# Patient Record
Sex: FEMALE | Race: WHITE | NOT HISPANIC OR LATINO | URBAN - METROPOLITAN AREA
[De-identification: names, ages, dates, MRNs, and addresses within clinical notes are randomized per-mention and may not be internally consistent; named-entity substitution may affect disease eponyms.]

---

## 2017-01-30 ENCOUNTER — HOSPITAL ENCOUNTER (OUTPATIENT)
Dept: ULTRASOUND IMAGING | Facility: CLINIC | Age: 37
Discharge: HOME | End: 2017-01-30
Attending: OBSTETRICS & GYNECOLOGY

## 2017-01-30 ENCOUNTER — HOSPITAL ENCOUNTER (OUTPATIENT)
Dept: MAMMOGRAPHY | Facility: CLINIC | Age: 37
Discharge: HOME | End: 2017-01-30
Attending: OBSTETRICS & GYNECOLOGY

## 2017-01-30 DIAGNOSIS — Z09 FOLLOW-UP EXAM: ICD-10-CM

## 2017-08-05 ENCOUNTER — HEALTH MAINTENANCE LETTER (OUTPATIENT)
Age: 37
End: 2017-08-05

## 2017-08-21 ENCOUNTER — COMMUNICATION - HEALTHEAST (OUTPATIENT)
Dept: HEALTH INFORMATION MANAGEMENT | Facility: CLINIC | Age: 37
End: 2017-08-21

## 2017-08-23 ENCOUNTER — COMMUNICATION - HEALTHEAST (OUTPATIENT)
Dept: HEALTH INFORMATION MANAGEMENT | Facility: CLINIC | Age: 37
End: 2017-08-23

## 2018-01-31 ENCOUNTER — OFFICE VISIT - HEALTHEAST (OUTPATIENT)
Dept: FAMILY MEDICINE | Facility: CLINIC | Age: 38
End: 2018-01-31

## 2018-01-31 DIAGNOSIS — R05.9 COUGH: ICD-10-CM

## 2018-01-31 DIAGNOSIS — J06.9 URI (UPPER RESPIRATORY INFECTION): ICD-10-CM

## 2018-01-31 LAB
DEPRECATED S PYO AG THROAT QL EIA: NORMAL
FLUAV AG SPEC QL IA: NORMAL
FLUBV AG SPEC QL IA: NORMAL

## 2018-01-31 ASSESSMENT — MIFFLIN-ST. JEOR: SCORE: 1249.88

## 2018-02-02 LAB — GROUP A STREP BY PCR: NORMAL

## 2018-05-29 ENCOUNTER — RECORDS - HEALTHEAST (OUTPATIENT)
Dept: ADMINISTRATIVE | Facility: OTHER | Age: 38
End: 2018-05-29

## 2018-06-12 ENCOUNTER — HOSPITAL ENCOUNTER (OUTPATIENT)
Dept: MAMMOGRAPHY | Facility: CLINIC | Age: 38
Discharge: HOME | End: 2018-06-12
Attending: OBSTETRICS & GYNECOLOGY

## 2018-06-12 DIAGNOSIS — N63.0 BREAST NODULE: ICD-10-CM

## 2018-08-12 ENCOUNTER — HEALTH MAINTENANCE LETTER (OUTPATIENT)
Age: 38
End: 2018-08-12

## 2019-11-05 ENCOUNTER — HEALTH MAINTENANCE LETTER (OUTPATIENT)
Age: 39
End: 2019-11-05

## 2019-12-20 ENCOUNTER — RECORDS - HEALTHEAST (OUTPATIENT)
Dept: MAMMOGRAPHY | Facility: CLINIC | Age: 39
End: 2019-12-20

## 2019-12-20 DIAGNOSIS — Z12.31 ENCOUNTER FOR SCREENING MAMMOGRAM FOR MALIGNANT NEOPLASM OF BREAST: ICD-10-CM

## 2019-12-24 ENCOUNTER — TRANSFERRED RECORDS (OUTPATIENT)
Dept: MULTI SPECIALTY CLINIC | Facility: CLINIC | Age: 39
End: 2019-12-24

## 2020-09-15 ENCOUNTER — RECORDS - HEALTHEAST (OUTPATIENT)
Dept: ADMINISTRATIVE | Facility: OTHER | Age: 40
End: 2020-09-15

## 2020-09-18 NOTE — PROGRESS NOTES
Calli is a 40 year old No obstetric history on file. female who presents for annual exam.     Besides routine health maintenance, she has no other health concerns today .    HPI:  The patient's PCP is Yolid, Leo    NP    Was seeing a private GYN clinic.   Is on BHRT, testosterone and prometrium.   Was getting annual paps. Thinks maybe there were abnormal cells to watch for.  No colposcopies.   Last mammo . Wnl.  Hx of breast cysts, had several imaging exams in past.     Exercising  3x/wk. Vit D.     GYNECOLOGIC HISTORY:    Patient's last menstrual period was 2020.    Regular menses? no  Menses every 26/ days.  Length of menses: 4 days    Her current contraception method is: none.  She  reports that she has never smoked. She has never used smokeless tobacco.    Patient is sexually active.  STD testing offered?  Declined  Last PHQ-9 score on record =   PHQ-9 SCORE 2020   PHQ-9 Total Score 0     Last GAD7 score on record =   JENN-7 SCORE 2020   Total Score 0     Alcohol Score = 1    HEALTH MAINTENANCE:  Cholesterol:   Cholesterol   Date Value Ref Range Status   2003 167 <200 mg/dL Final     Comment:     Cholesterol Reference Range:   <200  The NCEP recommends further         evaluation of:         1.  Patients with cholesterol             greater than 200 mg/dL             if additional risk factors             are present.         2.  All patients with a             cholesterol greater than             240 mg/dL.      Last Mammo: 2019, Result: Normal, Next Mammo: pt will schedule one when it is exactly a year  Pap:   Lab Results   Component Value Date    PAP NIL 2007    PAP NIL 2006      Colonoscopy:  NA, Result: Not applicable, Next Colonoscopy: 9 years.  Dexa:  NA    Health maintenance updated:  no    HISTORY:  OB History    Para Term  AB Living   3 3 3 0 0 3   SAB TAB Ectopic Multiple Live Births   0 0 0 0 3      # Outcome Date GA Lbr Rudy/2nd Weight  "Sex Delivery Anes PTL Lv   3 Term            2 Term            1 Term                Patient Active Problem List   Diagnosis     Basal Cell Carcinoma     CARDIOVASCULAR SCREENING; LDL GOAL LESS THAN 160     Past Surgical History:   Procedure Laterality Date     C BREAST AUGMENTATION  03/2019     C NONSPECIFIC PROCEDURE  AGE 7    R ELBOW ORIF     HC ENLARGE BREAST WITH IMPLANT  2003      Social History     Tobacco Use     Smoking status: Never Smoker     Smokeless tobacco: Never Used   Substance Use Topics     Alcohol use: No     Comment: None      Problem (# of Occurrences) Relation (Name,Age of Onset)    Cancer (1) Sister: Melanoma    Diabetes (1) Maternal Grandfather    Hypertension (1) Mother    No Known Problems (7) Father, Maternal Grandmother, Paternal Grandmother, Brother, Brother, Sister, Other       Negative family history of: C.A.D., Breast Cancer, Cancer - colorectal, Alcohol/Drug, Allergies            Current Outpatient Medications   Medication Sig     PROGESTERONE 100MG CAPSULES COMPOUND Take by mouth At Bedtime Take nightly by mouth     Testosterone 25 MG PLLT Unknown dosage testosterone pellets     vitamin D2 (ERGOCALCIFEROL) 49105 units (1250 mcg) capsule Take 50,000 Units by mouth daily     No current facility-administered medications for this visit.      Allergies   Allergen Reactions     Adhesive Tape      Bandaids     Erythromycin        Past medical, surgical, social and family histories were reviewed and updated in EPIC.    ROS:   12 point review of systems negative other than symptoms noted below or in the HPI.  Genitourinary: Hot Flashes, Irregular Menses and Night Sweats      EXAM:  /76   Pulse 62   Ht 1.702 m (5' 7\")   Wt 55.3 kg (122 lb)   LMP 09/07/2020   BMI 19.11 kg/m     BMI: Body mass index is 19.11 kg/m .    PHYSICAL EXAM:  Constitutional:   Appearance: Well nourished, well developed, alert, in no acute distress  Neck:  Lymph Nodes:  No lymphadenopathy present    Thyroid:  " Gland size normal, nontender, no nodules or masses present  on palpation  Chest:  Respiratory Effort:  Breathing unlabored  Cardiovascular:    Heart: Auscultation:  Regular rate, normal rhythm, no murmurs present  Breasts: Inspection of Breasts:  No lymphadenopathy present., Palpation of Breasts and Axillae:  No masses present on palpation, no breast tenderness., Axillary Lymph Nodes:  No lymphadenopathy present. and No nodularity, asymmetry or nipple discharge bilaterally.  Gastrointestinal:   Abdominal Examination:  Abdomen nontender to palpation, tone normal without rigidity or guarding, no masses present, umbilicus without lesions   Liver and Spleen:  No hepatomegaly present, liver nontender to palpation    Hernias:  No hernias present  Lymphatic: Lymph Nodes:  No other lymphadenopathy present  Skin:  General Inspection:  No rashes present, no lesions present, no areas of  discoloration  Neurologic:    Mental Status:  Oriented X3.  Normal strength and tone, sensory exam                grossly normal, mentation intact and speech normal.    Psychiatric:   Mentation appears normal and affect normal/bright.         Pelvic Exam:  External Genitalia:     Normal appearance for age, no discharge present, no tenderness present, no inflammatory lesions present, color normal  Vagina:     Normal vaginal vault without central or paravaginal defects, no discharge present, no inflammatory lesions present, no masses present  Bladder:     Nontender to palpation  Urethra:   Urethral Body:  Urethra palpation normal, urethra structural support normal   Urethral Meatus:  No erythema or lesions present  Cervix:     Appearance healthy, no lesions present, nontender to palpation, no bleeding present  Uterus:     Uterus: firm, normal sized and nontender, anteverted in position.   Adnexa:     No adnexal tenderness present, no adnexal masses present  Perineum:     Perineum within normal limits, no evidence of trauma, no rashes or skin  lesions present  Anus:     Anus within normal limits, no hemorrhoids present  Inguinal Lymph Nodes:     No lymphadenopathy present  Pubic Hair:     Normal pubic hair distribution for age  Genitalia and Groin:     No rashes present, no lesions present, no areas of discoloration, no masses present      COUNSELING:   Reviewed preventive health counseling, as reflected in patient instructions       Osteoporosis Prevention/Bone Health    BMI: Body mass index is 19.11 kg/m .      ASSESSMENT:  40 year old female with satisfactory annual exam.    ICD-10-CM    1. Encounter for gynecological examination without abnormal finding  Z01.419 Pap imaged thin layer screen with HPV - recommended age 30 - 65     HPV High Risk Types DNA Cervical   2. Cervical cancer screening  Z12.4        PLAN:  -Pap/hpv obtained for cervical cancer screening-unknown hx at this time due to no records available. Pt reports abnormal letters in past, most recently last year. If abnormal pap or HPV this time, will do colposcopy.  -Breast self awareness discussed. Due in Dec for mammogram.  -Discussed exercise-making plan, strength training. Nutrition encouraged.  -Colonoscopy age 44  -Osteoporosis prevention discussed.  -PCP manages labs  -On BHRT by another provider.   -Return one year for next annual exam          Glo Mcdaniel Masters, DO

## 2020-09-21 ENCOUNTER — OFFICE VISIT (OUTPATIENT)
Dept: OBGYN | Facility: CLINIC | Age: 40
End: 2020-09-21
Payer: COMMERCIAL

## 2020-09-21 VITALS
HEART RATE: 62 BPM | BODY MASS INDEX: 19.15 KG/M2 | SYSTOLIC BLOOD PRESSURE: 110 MMHG | WEIGHT: 122 LBS | HEIGHT: 67 IN | DIASTOLIC BLOOD PRESSURE: 76 MMHG

## 2020-09-21 DIAGNOSIS — Z01.419 ENCOUNTER FOR GYNECOLOGICAL EXAMINATION WITHOUT ABNORMAL FINDING: Primary | ICD-10-CM

## 2020-09-21 DIAGNOSIS — Z12.4 CERVICAL CANCER SCREENING: ICD-10-CM

## 2020-09-21 PROCEDURE — 87624 HPV HI-RISK TYP POOLED RSLT: CPT | Performed by: OBSTETRICS & GYNECOLOGY

## 2020-09-21 PROCEDURE — G0145 SCR C/V CYTO,THINLAYER,RESCR: HCPCS | Performed by: OBSTETRICS & GYNECOLOGY

## 2020-09-21 PROCEDURE — 99386 PREV VISIT NEW AGE 40-64: CPT | Performed by: OBSTETRICS & GYNECOLOGY

## 2020-09-21 RX ORDER — ERGOCALCIFEROL 1.25 MG/1
50000 CAPSULE, LIQUID FILLED ORAL DAILY
COMMUNITY

## 2020-09-21 ASSESSMENT — MIFFLIN-ST. JEOR: SCORE: 1251.02

## 2020-09-21 ASSESSMENT — ANXIETY QUESTIONNAIRES
3. WORRYING TOO MUCH ABOUT DIFFERENT THINGS: NOT AT ALL
GAD7 TOTAL SCORE: 0
1. FEELING NERVOUS, ANXIOUS, OR ON EDGE: NOT AT ALL
5. BEING SO RESTLESS THAT IT IS HARD TO SIT STILL: NOT AT ALL
6. BECOMING EASILY ANNOYED OR IRRITABLE: NOT AT ALL
IF YOU CHECKED OFF ANY PROBLEMS ON THIS QUESTIONNAIRE, HOW DIFFICULT HAVE THESE PROBLEMS MADE IT FOR YOU TO DO YOUR WORK, TAKE CARE OF THINGS AT HOME, OR GET ALONG WITH OTHER PEOPLE: NOT DIFFICULT AT ALL
7. FEELING AFRAID AS IF SOMETHING AWFUL MIGHT HAPPEN: NOT AT ALL
2. NOT BEING ABLE TO STOP OR CONTROL WORRYING: NOT AT ALL

## 2020-09-21 ASSESSMENT — PATIENT HEALTH QUESTIONNAIRE - PHQ9
5. POOR APPETITE OR OVEREATING: NOT AT ALL
SUM OF ALL RESPONSES TO PHQ QUESTIONS 1-9: 0

## 2020-09-21 NOTE — LETTER
October 7, 2020      Calli Jarvis  [address]  [facility] [address]        Dear ,    We are happy to inform you that your recent Pap smear and Human Papillomavirus (HPV) test results are normal and negative.    It is recommended that you have your next Pap smear and Human Papillomavirus (HPV) test in 3 years. You will also need to return to the clinic every year for an annual wellness visit.    If you have additional questions regarding this result, please contact our office and we will be happy to assist you.      Sincerely,    Your [facility] Care Team

## 2020-09-21 NOTE — Clinical Note
Other Tests found in the patient's chart through Chart Review/Care Everywhere:    Mammogram done by [facility] this date: 2019    Note to Abstraction: If this section is blank, no results were found via Chart Review/Care Everywhere.

## 2020-09-21 NOTE — NURSING NOTE
Other Tests found in the patient's chart through Chart Review/Care Everywhere:    Mammogram done by [facility] this date: 2019    Note to Abstraction: If this section is blank, no results were found via Chart Review/Care Everywhere.

## 2020-09-21 NOTE — PATIENT INSTRUCTIONS
-Daily total calcium intake (between food/supplements) should be 1000mg which equates to 3-4 servings calcium containing food per day; VItamin D 1000IU.   Foods rich in calcium are: milk, cheese, yogurt, seafood, sardines and canned salmon, leafy green vegetables such as estefani greens, spinach and kale, beans and lentils, almonds, seeds (poppy, sesame, celery, candelario), rhubarb, dried fruit such as figs, whey protein, tofu and edamame, amaranth, other foods with added calcium such as orange juice and some cereals.   If adequate amount not taken in diet, then a supplement may be needed.     -I also recommend increasing your dietary fiber by starting Metamucil (powder mixed in glass of water) twice daily

## 2020-09-22 ASSESSMENT — ANXIETY QUESTIONNAIRES: GAD7 TOTAL SCORE: 0

## 2020-09-25 LAB
COPATH REPORT: NORMAL
PAP: NORMAL

## 2020-09-28 LAB
FINAL DIAGNOSIS: NORMAL
HPV HR 12 DNA CVX QL NAA+PROBE: NEGATIVE
HPV16 DNA SPEC QL NAA+PROBE: NEGATIVE
HPV18 DNA SPEC QL NAA+PROBE: NEGATIVE
SPECIMEN DESCRIPTION: NORMAL
SPECIMEN SOURCE CVX/VAG CYTO: NORMAL

## 2020-11-22 ENCOUNTER — HEALTH MAINTENANCE LETTER (OUTPATIENT)
Age: 40
End: 2020-11-22

## 2021-03-16 ENCOUNTER — RECORDS - HEALTHEAST (OUTPATIENT)
Dept: MAMMOGRAPHY | Facility: CLINIC | Age: 41
End: 2021-03-16

## 2021-03-16 DIAGNOSIS — Z12.31 ENCOUNTER FOR SCREENING MAMMOGRAM FOR MALIGNANT NEOPLASM OF BREAST: ICD-10-CM

## 2021-06-01 VITALS — BODY MASS INDEX: 18.19 KG/M2 | WEIGHT: 120 LBS | HEIGHT: 68 IN

## 2021-06-15 NOTE — PROGRESS NOTES
"Assessment/Plan:         Visit Diagnoses     URI (upper respiratory infection)    -  Primary        Patient Instructions   Suspect probable viral etiology.    1) Over the counter cold medication as needed.  2) Ibuprofen every 6 hours or Tylenol every 4 hours as needed for fever/discomfort.  3) Follow up in 7-10 days if not improving, sooner if worsening or other concerns.            Subjective:   Calli Jarvis is a 37 y.o. female who presents today complaining of cough, congestion, chills.  No fever.  Symptoms have been ongoing for about 4 days.      Patient Active Problem List   Diagnosis     Warts        Past Medical History:   Diagnosis Date     Basal cell cancer 2009 - 2014        Past Surgical History:   Procedure Laterality Date     AUGMENTATION MAMMAPLASTY Bilateral 2003        No current outpatient prescriptions on file.      Review of Systems  See HPI     Objective:     Vitals:    01/31/18 1637   BP: 114/70   Patient Site: Left Arm   Patient Position: Sitting   Cuff Size: Adult Regular   Pulse: 67   Resp: 16   Temp: 98.5  F (36.9  C)   TempSrc: Oral   SpO2: 96%   Weight: 120 lb (54.4 kg)   Height: 5' 7.5\" (1.715 m)        Physical Exam   Constitutional: She appears well-developed and well-nourished. No distress.   HENT:   Right Ear: Tympanic membrane is not erythematous, not retracted and not bulging. A middle ear effusion is present.   Left Ear: Tympanic membrane is not erythematous, not retracted and not bulging. A middle ear effusion is present.   Mouth/Throat: Posterior oropharyngeal edema and posterior oropharyngeal erythema present. No oropharyngeal exudate or tonsillar abscesses.   Eyes: Conjunctivae are normal.   Neck: Normal range of motion. Neck supple.   Cardiovascular: Normal rate and regular rhythm.    No murmur heard.  Pulmonary/Chest: Effort normal and breath sounds normal. No respiratory distress. She has no wheezes. She has no rales.   Lymphadenopathy:     She has no cervical adenopathy. "        Results for orders placed or performed in visit on 01/31/18   Influenza A/B Rapid Test   Result Value Ref Range    Influenza  A, Rapid Antigen No Influenza A antigen detected No Influenza A antigen detected    Influenza B, Rapid Antigen No Influenza B antigen detected No Influenza B antigen detected   Rapid Strep A Screen-Throat   Result Value Ref Range    Rapid Strep A Antigen No Group A Strep detected, presumptive negative No Group A Strep detected, presumptive negative

## 2021-09-19 ENCOUNTER — HEALTH MAINTENANCE LETTER (OUTPATIENT)
Age: 41
End: 2021-09-19

## 2021-10-07 NOTE — PROGRESS NOTES
Calli is a 41 year old  female who presents for annual exam.     Besides routine health maintenance, she has no other health concerns today .    HPI:  The patient's PCP is  Ubaldo Gutierrez DO.      Plans flu vacc in from work.     Periods are more irregular.  Continues on testosterone and progesterone.       GYNECOLOGIC HISTORY:    Patient's last menstrual period was 2021.    Regular menses? no  Menses every 45 days.  Length of menses: 3 days    Her current contraception method is: vasectomy  She  reports that she has never smoked. She has never used smokeless tobacco.    Patient is sexually active.  STD testing offered?  Declined  Last PHQ-9 score on record =   PHQ-9 SCORE 10/8/2021   PHQ-9 Total Score 0     Last GAD7 score on record =   JENN-7 SCORE 10/8/2021   Total Score 0     Alcohol Score = 0    HEALTH MAINTENANCE:  Cholesterol:   Cholesterol   Date Value Ref Range Status   2003 167 <200 mg/dL Final     Comment:     Cholesterol Reference Range:   <200  The NCEP recommends further         evaluation of:         1.  Patients with cholesterol             greater than 200 mg/dL             if additional risk factors             are present.         2.  All patients with a             cholesterol greater than             240 mg/dL.      Last Mammo: 3/16/2021, Result: Normal, Next Mammo:    Pap: (  Lab Results   Component Value Date    PAP NIL HPV- 2020    PAP NIL 2007    PAP NIL 2006      Colonoscopy:  NEVER, Result: Not applicable, Next Colonoscopy: Due at age 45-50 years.  Dexa:  never    Health maintenance updated:  yes    HISTORY:  OB History    Para Term  AB Living   3 3 3 0 0 3   SAB TAB Ectopic Multiple Live Births   0 0 0 0 3      # Outcome Date GA Lbr Rudy/2nd Weight Sex Delivery Anes PTL Lv   3 Term            2 Term            1 Term                Patient Active Problem List   Diagnosis     Basal Cell Carcinoma     CARDIOVASCULAR SCREENING; LDL  "GOAL LESS THAN 160     Past Surgical History:   Procedure Laterality Date     C BREAST AUGMENTATION  03/2019     HC ENLARGE BREAST WITH IMPLANT  2003     MAMMOPLASTY AUGMENTATION Bilateral 2003     ZZC NONSPECIFIC PROCEDURE  AGE 7    R ELBOW ORIF      Social History     Tobacco Use     Smoking status: Never Smoker     Smokeless tobacco: Never Used   Substance Use Topics     Alcohol use: Not Currently     Comment: None      Problem (# of Occurrences) Relation (Name,Age of Onset)    Cancer (1) Sister: Melanoma    Diabetes (1) Maternal Grandfather    Hypertension (1) Mother    Melanoma (1) Sister    No Known Problems (7) Father, Maternal Grandmother, Paternal Grandmother, Brother, Brother, Sister, Other       Negative family history of: C.A.D., Breast Cancer, Cancer - colorectal, Alcohol/Drug, Allergies            Current Outpatient Medications   Medication Sig     PROGESTERONE 100MG CAPSULES COMPOUND Take by mouth At Bedtime Take nightly by mouth     Testosterone 25 MG PLLT Unknown dosage testosterone pellets     vitamin D2 (ERGOCALCIFEROL) 51210 units (1250 mcg) capsule Take 50,000 Units by mouth daily     No current facility-administered medications for this visit.     Allergies   Allergen Reactions     Adhesive Tape      Bandaids     Erythromycin        Past medical, surgical, social and family histories were reviewed and updated in EPIC.    ROS:   12 point review of systems negative other than symptoms noted below or in the HPI.  No urinary frequency or dysuria, bladder or kidney problems    EXAM:  /68   Pulse 70   Ht 1.702 m (5' 7\")   Wt 53.1 kg (117 lb)   LMP 09/20/2021   BMI 18.32 kg/m     BMI: Body mass index is 18.32 kg/m .    PHYSICAL EXAM:  Constitutional:   Appearance: Well nourished, well developed, alert, in no acute distress  Neck:  Lymph Nodes:  No lymphadenopathy present    Thyroid:  Gland size normal, nontender, no nodules or masses present  on palpation  Chest:  Respiratory Effort:  " Breathing unlabored  Cardiovascular:    Heart: Auscultation:  Regular rate, normal rhythm, no murmurs present  Breasts: Inspection of Breasts:  No lymphadenopathy present., Palpation of Breasts and Axillae:  No masses present on palpation, no breast tenderness., Axillary Lymph Nodes:  No lymphadenopathy present. and No nodularity, asymmetry or nipple discharge bilaterally.  Gastrointestinal:   Abdominal Examination:  Abdomen nontender to palpation, tone normal without rigidity or guarding, no masses present, umbilicus without lesions   Liver and Spleen:  No hepatomegaly present, liver nontender to palpation    Hernias:  No hernias present  Lymphatic: Lymph Nodes:  No other lymphadenopathy present  Skin:  General Inspection:  No rashes present, no lesions present, no areas of  discoloration  Neurologic:    Mental Status:  Oriented X3.  Normal strength and tone, sensory exam                grossly normal, mentation intact and speech normal.    Psychiatric:   Mentation appears normal and affect normal/bright.         Pelvic Exam:  External Genitalia:     Normal appearance for age, no discharge present, no tenderness present, no inflammatory lesions present, color normal  Vagina:     Normal vaginal vault without central or paravaginal defects, no discharge present, no inflammatory lesions present, no masses present  Bladder:     Nontender to palpation  Urethra:   Urethral Body:  Urethra palpation normal, urethra structural support normal   Urethral Meatus:  No erythema or lesions present  Cervix:     Appearance healthy, no lesions present, nontender to palpation, no bleeding present  Uterus:     Uterus: firm, normal sized and nontender, anteverted in position.   Adnexa:     No adnexal tenderness present, no adnexal masses present  Perineum:     Perineum within normal limits, no evidence of trauma, no rashes or skin lesions present  Anus:     Anus within normal limits, no hemorrhoids present  Inguinal Lymph Nodes:     No  lymphadenopathy present  Pubic Hair:     Normal pubic hair distribution for age  Genitalia and Groin:     No rashes present, no lesions present, no areas of discoloration, no masses present      COUNSELING:   Reviewed preventive health counseling, as reflected in patient instructions       Osteoporosis prevention/bone health    BMI: Body mass index is 18.32 kg/m .      ASSESSMENT:  41 year old female with satisfactory annual exam.    ICD-10-CM    1. Encounter for gynecological examination without abnormal finding  Z01.419        PLAN:  -UTD for cervical cancer screening. Plan cotestin 3yrs due to no hx. Will have pt sign records release to get paps from past.  -Breast self awareness discussed. Due in March 2022 for mammogram.  -Discussed exercise-making plan, strength training. Nutrition encouraged.  -Colonoscopy age 44  -Osteoporosis prevention discussed.  -Return one year for next annual exam          Glo Mcdaniel Masters, DO

## 2021-10-08 ENCOUNTER — OFFICE VISIT (OUTPATIENT)
Dept: OBGYN | Facility: CLINIC | Age: 41
End: 2021-10-08
Payer: COMMERCIAL

## 2021-10-08 VITALS
WEIGHT: 117 LBS | HEIGHT: 67 IN | BODY MASS INDEX: 18.36 KG/M2 | SYSTOLIC BLOOD PRESSURE: 114 MMHG | DIASTOLIC BLOOD PRESSURE: 68 MMHG | HEART RATE: 70 BPM

## 2021-10-08 DIAGNOSIS — Z01.419 ENCOUNTER FOR GYNECOLOGICAL EXAMINATION WITHOUT ABNORMAL FINDING: Primary | ICD-10-CM

## 2021-10-08 PROCEDURE — 99396 PREV VISIT EST AGE 40-64: CPT | Performed by: OBSTETRICS & GYNECOLOGY

## 2021-10-08 ASSESSMENT — PATIENT HEALTH QUESTIONNAIRE - PHQ9
5. POOR APPETITE OR OVEREATING: NOT AT ALL
SUM OF ALL RESPONSES TO PHQ QUESTIONS 1-9: 0

## 2021-10-08 ASSESSMENT — ANXIETY QUESTIONNAIRES
2. NOT BEING ABLE TO STOP OR CONTROL WORRYING: NOT AT ALL
3. WORRYING TOO MUCH ABOUT DIFFERENT THINGS: NOT AT ALL
IF YOU CHECKED OFF ANY PROBLEMS ON THIS QUESTIONNAIRE, HOW DIFFICULT HAVE THESE PROBLEMS MADE IT FOR YOU TO DO YOUR WORK, TAKE CARE OF THINGS AT HOME, OR GET ALONG WITH OTHER PEOPLE: NOT DIFFICULT AT ALL
6. BECOMING EASILY ANNOYED OR IRRITABLE: NOT AT ALL
5. BEING SO RESTLESS THAT IT IS HARD TO SIT STILL: NOT AT ALL
7. FEELING AFRAID AS IF SOMETHING AWFUL MIGHT HAPPEN: NOT AT ALL
GAD7 TOTAL SCORE: 0
1. FEELING NERVOUS, ANXIOUS, OR ON EDGE: NOT AT ALL

## 2021-10-08 ASSESSMENT — MIFFLIN-ST. JEOR: SCORE: 1223.34

## 2021-10-09 ASSESSMENT — ANXIETY QUESTIONNAIRES: GAD7 TOTAL SCORE: 0

## 2022-05-04 ENCOUNTER — OFFICE VISIT (OUTPATIENT)
Dept: FAMILY MEDICINE | Facility: CLINIC | Age: 42
End: 2022-05-04
Payer: COMMERCIAL

## 2022-05-04 VITALS
BODY MASS INDEX: 18.69 KG/M2 | HEIGHT: 67 IN | WEIGHT: 119.1 LBS | TEMPERATURE: 98.1 F | RESPIRATION RATE: 12 BRPM | DIASTOLIC BLOOD PRESSURE: 62 MMHG | SYSTOLIC BLOOD PRESSURE: 104 MMHG | HEART RATE: 64 BPM

## 2022-05-04 DIAGNOSIS — Z11.59 NEED FOR HEPATITIS C SCREENING TEST: ICD-10-CM

## 2022-05-04 DIAGNOSIS — Z83.49 FAMILY HISTORY OF HEMOCHROMATOSIS: Primary | ICD-10-CM

## 2022-05-04 DIAGNOSIS — Z23 NEED FOR VACCINATION: ICD-10-CM

## 2022-05-04 LAB
ERYTHROCYTE [DISTWIDTH] IN BLOOD BY AUTOMATED COUNT: 12.3 % (ref 10–15)
HCT VFR BLD AUTO: 42.4 % (ref 35–47)
HGB BLD-MCNC: 14.6 G/DL (ref 11.7–15.7)
HOLD SPECIMEN: NORMAL
LAB DIRECTOR COMMENTS: NORMAL
LAB DIRECTOR DISCLAIMER: NORMAL
LAB DIRECTOR INTERPRETATION: NORMAL
LAB DIRECTOR METHODOLOGY: NORMAL
LAB DIRECTOR RESULTS: NORMAL
MCH RBC QN AUTO: 30.6 PG (ref 26.5–33)
MCHC RBC AUTO-ENTMCNC: 34.4 G/DL (ref 31.5–36.5)
MCV RBC AUTO: 89 FL (ref 78–100)
PLATELET # BLD AUTO: 285 10E3/UL (ref 150–450)
RBC # BLD AUTO: 4.77 10E6/UL (ref 3.8–5.2)
SPECIMEN DESCRIPTION: NORMAL
WBC # BLD AUTO: 5.6 10E3/UL (ref 4–11)

## 2022-05-04 PROCEDURE — 85027 COMPLETE CBC AUTOMATED: CPT | Performed by: FAMILY MEDICINE

## 2022-05-04 PROCEDURE — 86803 HEPATITIS C AB TEST: CPT | Performed by: FAMILY MEDICINE

## 2022-05-04 PROCEDURE — 36415 COLL VENOUS BLD VENIPUNCTURE: CPT | Performed by: FAMILY MEDICINE

## 2022-05-04 PROCEDURE — 90715 TDAP VACCINE 7 YRS/> IM: CPT | Performed by: FAMILY MEDICINE

## 2022-05-04 PROCEDURE — 81256 HFE GENE: CPT | Performed by: FAMILY MEDICINE

## 2022-05-04 PROCEDURE — G0452 MOLECULAR PATHOLOGY INTERPR: HCPCS | Mod: 59 | Performed by: PATHOLOGY

## 2022-05-04 PROCEDURE — 90471 IMMUNIZATION ADMIN: CPT | Performed by: FAMILY MEDICINE

## 2022-05-04 PROCEDURE — 99203 OFFICE O/P NEW LOW 30 MIN: CPT | Mod: 25 | Performed by: FAMILY MEDICINE

## 2022-05-04 NOTE — PROGRESS NOTES
Problem List Items Addressed This Visit        Other    Family history of hemochromatosis - Primary     Based on father's diagnosis, will get testing.  Will check CBC currently if anemia we will check iron studies.  If gene test to come back positive we will send to hematology for further evaluation and surveillance recommendations.    Genetic consent was reviewed with the patient and signed.           Relevant Orders    Hemochromatosis mutation    CBC with Platelets and Reflex to Iron Studies      Other Visit Diagnoses     Need for hepatitis C screening test        As per USPSTF guidelines    Relevant Orders    Hepatitis C Screen Reflex to HCV RNA Quant and Genotype    Need for vaccination        Relevant Orders    TDAP VACCINE (Adacel, Boostrix) (Completed)        Return in about 5 months (around 10/8/2022) for Routine preventive.    Romelia Mccurdy is a 41 year old who presents for the following health issues   Presents clinic to discuss father's recent diagnosis of hemochromatosis.  Of note her cousin on her father side also has been diagnosed.  Family members have been advised to go for testing.  Of note this is a cousin of her paternal aunt.  She is never had any abnormal CBCs.  3 normal pregnancies without any difficulties.  She has not had any darkening of the skin nor blood pressure or vision problems.    Imm/Inj    History of Present Illness       Reason for visit:  Hemochromatosis screening, hereditary  Symptoms include:  None    She eats 2-3 servings of fruits and vegetables daily.She consumes 1 sweetened beverage(s) daily.She exercises with enough effort to increase her heart rate 10 to 19 minutes per day.  She exercises with enough effort to increase her heart rate 3 or less days per week.   She is taking medications regularly.       Review of Systems   All other systems reviewed and are negative.           Objective    /62 (BP Location: Left arm, Patient Position: Sitting, Cuff Size: Adult  "Regular)   Pulse 64   Temp 98.1  F (36.7  C) (Oral)   Resp 12   Ht 1.702 m (5' 7\")   Wt 54 kg (119 lb 1.6 oz)   LMP 03/04/2022 (Exact Date)   Breastfeeding No   BMI 18.65 kg/m    Body mass index is 18.65 kg/m .  Physical Exam  Vitals and nursing note reviewed.   Constitutional:       General: She is not in acute distress.     Appearance: Normal appearance. She is not ill-appearing.   HENT:      Head: Normocephalic and atraumatic.   Eyes:      Extraocular Movements: Extraocular movements intact.      Conjunctiva/sclera: Conjunctivae normal.   Pulmonary:      Effort: Pulmonary effort is normal.   Neurological:      Mental Status: She is alert and oriented to person, place, and time.   Psychiatric:         Attention and Perception: Attention normal.         Mood and Affect: Mood normal.         Speech: Speech normal.         Thought Content: Thought content normal.                  "

## 2022-05-04 NOTE — ASSESSMENT & PLAN NOTE
Based on father's diagnosis, will get testing.  Will check CBC currently if anemia we will check iron studies.  If gene test to come back positive we will send to hematology for further evaluation and surveillance recommendations.    Genetic consent was reviewed with the patient and signed.

## 2022-05-05 LAB — HCV AB SERPL QL IA: NONREACTIVE

## 2022-06-15 ENCOUNTER — HOSPITAL ENCOUNTER (OUTPATIENT)
Dept: MAMMOGRAPHY | Facility: CLINIC | Age: 42
Discharge: HOME | End: 2022-06-15
Attending: OBSTETRICS & GYNECOLOGY | Admitting: OBSTETRICS & GYNECOLOGY
Payer: COMMERCIAL

## 2022-06-15 DIAGNOSIS — Z12.31 VISIT FOR SCREENING MAMMOGRAM: ICD-10-CM

## 2022-06-15 PROCEDURE — 77067 SCR MAMMO BI INCL CAD: CPT

## 2022-08-24 ENCOUNTER — TRANSFERRED RECORDS (OUTPATIENT)
Dept: HEALTH INFORMATION MANAGEMENT | Facility: CLINIC | Age: 42
End: 2022-08-24

## 2022-11-20 ENCOUNTER — HEALTH MAINTENANCE LETTER (OUTPATIENT)
Age: 42
End: 2022-11-20

## 2023-06-23 ENCOUNTER — HOSPITAL ENCOUNTER (OUTPATIENT)
Dept: MAMMOGRAPHY | Facility: CLINIC | Age: 43
Discharge: HOME | End: 2023-06-23
Attending: FAMILY MEDICINE | Admitting: FAMILY MEDICINE
Payer: COMMERCIAL

## 2023-06-23 DIAGNOSIS — Z12.31 VISIT FOR SCREENING MAMMOGRAM: ICD-10-CM

## 2023-06-23 PROCEDURE — 77063 BREAST TOMOSYNTHESIS BI: CPT

## 2023-11-25 ENCOUNTER — HEALTH MAINTENANCE LETTER (OUTPATIENT)
Age: 43
End: 2023-11-25

## 2024-06-25 ENCOUNTER — HOSPITAL ENCOUNTER (OUTPATIENT)
Dept: MAMMOGRAPHY | Facility: CLINIC | Age: 44
Discharge: HOME | End: 2024-06-25
Attending: FAMILY MEDICINE | Admitting: FAMILY MEDICINE
Payer: COMMERCIAL

## 2024-06-25 DIAGNOSIS — Z12.31 VISIT FOR SCREENING MAMMOGRAM: ICD-10-CM

## 2024-06-25 PROCEDURE — 77063 BREAST TOMOSYNTHESIS BI: CPT

## 2025-01-04 ENCOUNTER — HEALTH MAINTENANCE LETTER (OUTPATIENT)
Age: 45
End: 2025-01-04

## 2025-05-14 ENCOUNTER — OFFICE VISIT (OUTPATIENT)
Dept: MIDWIFE SERVICES | Facility: CLINIC | Age: 45
End: 2025-05-14
Payer: COMMERCIAL

## 2025-05-14 ENCOUNTER — RESULTS FOLLOW-UP (OUTPATIENT)
Dept: MIDWIFE SERVICES | Facility: CLINIC | Age: 45
End: 2025-05-14

## 2025-05-14 VITALS
WEIGHT: 118.6 LBS | HEIGHT: 68 IN | SYSTOLIC BLOOD PRESSURE: 102 MMHG | HEART RATE: 72 BPM | BODY MASS INDEX: 17.98 KG/M2 | DIASTOLIC BLOOD PRESSURE: 76 MMHG

## 2025-05-14 DIAGNOSIS — L11.1 GROVER'S DISEASE: ICD-10-CM

## 2025-05-14 DIAGNOSIS — Z01.419 NORMAL GYNECOLOGIC EXAMINATION: Primary | ICD-10-CM

## 2025-05-14 DIAGNOSIS — Z13.1 SCREENING FOR DIABETES MELLITUS: ICD-10-CM

## 2025-05-14 DIAGNOSIS — Z13.0 SCREENING FOR DEFICIENCY ANEMIA: ICD-10-CM

## 2025-05-14 DIAGNOSIS — Z13.220 SCREENING CHOLESTEROL LEVEL: ICD-10-CM

## 2025-05-14 DIAGNOSIS — Z12.4 SCREENING FOR CERVICAL CANCER: ICD-10-CM

## 2025-05-14 DIAGNOSIS — Z12.11 SCREENING FOR COLON CANCER: ICD-10-CM

## 2025-05-14 LAB
CHOLEST SERPL-MCNC: 168 MG/DL
ERYTHROCYTE [DISTWIDTH] IN BLOOD BY AUTOMATED COUNT: 12.6 % (ref 10–15)
FASTING STATUS PATIENT QL REPORTED: YES
FASTING STATUS PATIENT QL REPORTED: YES
GLUCOSE SERPL-MCNC: 84 MG/DL (ref 70–99)
HCT VFR BLD AUTO: 38.3 % (ref 35–47)
HDLC SERPL-MCNC: 75 MG/DL
HGB BLD-MCNC: 12.9 G/DL (ref 11.7–15.7)
LDLC SERPL CALC-MCNC: 80 MG/DL
MCH RBC QN AUTO: 29.9 PG (ref 26.5–33)
MCHC RBC AUTO-ENTMCNC: 33.7 G/DL (ref 31.5–36.5)
MCV RBC AUTO: 89 FL (ref 78–100)
NONHDLC SERPL-MCNC: 93 MG/DL
PLATELET # BLD AUTO: 301 10E3/UL (ref 150–450)
RBC # BLD AUTO: 4.32 10E6/UL (ref 3.8–5.2)
TRIGL SERPL-MCNC: 65 MG/DL
WBC # BLD AUTO: 4.2 10E3/UL (ref 4–11)

## 2025-05-14 PROCEDURE — 99459 PELVIC EXAMINATION: CPT | Performed by: ADVANCED PRACTICE MIDWIFE

## 2025-05-14 PROCEDURE — 87624 HPV HI-RISK TYP POOLED RSLT: CPT | Performed by: ADVANCED PRACTICE MIDWIFE

## 2025-05-14 PROCEDURE — 80061 LIPID PANEL: CPT | Performed by: ADVANCED PRACTICE MIDWIFE

## 2025-05-14 PROCEDURE — 99203 OFFICE O/P NEW LOW 30 MIN: CPT | Performed by: ADVANCED PRACTICE MIDWIFE

## 2025-05-14 PROCEDURE — 3074F SYST BP LT 130 MM HG: CPT | Performed by: ADVANCED PRACTICE MIDWIFE

## 2025-05-14 PROCEDURE — 36415 COLL VENOUS BLD VENIPUNCTURE: CPT | Performed by: ADVANCED PRACTICE MIDWIFE

## 2025-05-14 PROCEDURE — 82947 ASSAY GLUCOSE BLOOD QUANT: CPT | Performed by: ADVANCED PRACTICE MIDWIFE

## 2025-05-14 PROCEDURE — 85027 COMPLETE CBC AUTOMATED: CPT | Performed by: ADVANCED PRACTICE MIDWIFE

## 2025-05-14 PROCEDURE — 3078F DIAST BP <80 MM HG: CPT | Performed by: ADVANCED PRACTICE MIDWIFE

## 2025-05-14 RX ORDER — PROGESTERONE 200 MG/1
200 CAPSULE ORAL AT BEDTIME
COMMUNITY
Start: 2025-02-13

## 2025-05-14 NOTE — PROGRESS NOTES
"SUBJECTIVE:  Calli Jarvis is a 44 year old female   Chief Complaint   Patient presents with    Gyn Exam     Calli is here for pap smear and fasting labs (offered full physical today and she declines). Shares she is doing well overall. HRT medications managed by OBGYN in [place]. She is interested in the Cologuard for colon cancer screening as well. Denies personal or family hx of colon CA. Remote hx of abnormal pap smear. No periods for a couple years. BC is partner vasectomy.    Active diagnoses this visit:   Screening for cervical cancer  Screening for deficiency anemia  Screening cholesterol level  Screening for diabetes mellitus  Screening for colon cancer  Normal gynecologic examination    Review Of Systems: negative except that which is obtained in the HPI.  Medical, surgical, OB and family histories, medications and allergies reviewed and updated.    OBJECTIVE:  /76 (BP Location: Left arm, Patient Position: Sitting, Cuff Size: Adult Regular)   Pulse 72   Ht 1.715 m (5' 7.5\")   Wt 53.8 kg (118 lb 9.6 oz)   LMP  (LMP Unknown)   BMI 18.30 kg/m      Exam:  Constitutional: healthy, alert, and no distress  Cardiovascular: Well perfused.  Respiratory: Breathing unlabored.  : Normal external genitalia without lesions  Pelvic exam: normal vagina and vulva, normal cervix without lesions or tenderness, pap smear done. Declined bimanual exam.  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  Psychiatric: mentation appears normal and affect normal/bright    ASSESSMENT / PLAN:  (Z01.419) Normal gynecologic examination  (primary encounter diagnosis)  (Z12.4) Screening for cervical cancer  Plan: TX PELVIC EXAMINATION, HPV and Gynecologic Cytology Panel    (Z13.0) Screening for deficiency anemia  Plan: CBC with platelets    (Z13.220) Screening cholesterol level  Plan: " Lipid panel reflex to direct LDL Fasting    (Z13.1) Screening for diabetes mellitus  Plan: Glucose    (Z12.11) Screening for colon cancer  Comment: Discussed options and timing for colon CA screening. Patient elects for Cologuard.  Plan: COLOGUARD([employer])       35 minutes on the date of the encounter doing chart review, patient visit, and documentation    EULA Aguirre, CNM

## 2025-05-15 ENCOUNTER — ORDERS ONLY (AUTO-RELEASED) (OUTPATIENT)
Dept: MIDWIFE SERVICES | Facility: CLINIC | Age: 45
End: 2025-05-15
Payer: COMMERCIAL

## 2025-05-15 DIAGNOSIS — Z12.11 SCREENING FOR COLON CANCER: ICD-10-CM

## 2025-05-15 LAB
HPV HR 12 DNA CVX QL NAA+PROBE: NEGATIVE
HPV16 DNA CVX QL NAA+PROBE: NEGATIVE
HPV18 DNA CVX QL NAA+PROBE: NEGATIVE
HUMAN PAPILLOMA VIRUS FINAL DIAGNOSIS: NORMAL

## 2025-05-19 LAB
BKR AP ASSOCIATED HPV REPORT: NORMAL
BKR LAB AP GYN ADEQUACY: NORMAL
BKR LAB AP GYN INTERPRETATION: NORMAL
BKR LAB AP PREVIOUS ABNORMAL: NORMAL
PATH REPORT.COMMENTS IMP SPEC: NORMAL
PATH REPORT.COMMENTS IMP SPEC: NORMAL
PATH REPORT.RELEVANT HX SPEC: NORMAL

## 2025-06-03 LAB — NONINV COLON CA DNA+OCC BLD SCRN STL QL: NEGATIVE
